# Patient Record
Sex: FEMALE | Race: WHITE | Employment: OTHER | URBAN - METROPOLITAN AREA
[De-identification: names, ages, dates, MRNs, and addresses within clinical notes are randomized per-mention and may not be internally consistent; named-entity substitution may affect disease eponyms.]

---

## 2017-01-01 ENCOUNTER — HOSPITAL ENCOUNTER (INPATIENT)
Facility: HOSPITAL | Age: 82
LOS: 3 days | Discharge: HOSPICE | DRG: 757 | End: 2017-01-01
Attending: EMERGENCY MEDICINE | Admitting: HOSPITALIST
Payer: MEDICARE

## 2017-01-01 ENCOUNTER — APPOINTMENT (OUTPATIENT)
Dept: GENERAL RADIOLOGY | Facility: HOSPITAL | Age: 82
DRG: 757 | End: 2017-01-01
Attending: EMERGENCY MEDICINE
Payer: MEDICARE

## 2017-01-01 ENCOUNTER — APPOINTMENT (OUTPATIENT)
Dept: CT IMAGING | Facility: HOSPITAL | Age: 82
DRG: 757 | End: 2017-01-01
Attending: HOSPITALIST
Payer: MEDICARE

## 2017-01-01 VITALS
DIASTOLIC BLOOD PRESSURE: 92 MMHG | BODY MASS INDEX: 16.38 KG/M2 | HEIGHT: 62 IN | TEMPERATURE: 97 F | OXYGEN SATURATION: 97 % | HEART RATE: 85 BPM | RESPIRATION RATE: 17 BRPM | SYSTOLIC BLOOD PRESSURE: 123 MMHG | WEIGHT: 89 LBS

## 2017-01-01 DIAGNOSIS — N17.9 AKI (ACUTE KIDNEY INJURY) (HCC): ICD-10-CM

## 2017-01-01 DIAGNOSIS — R53.1 WEAKNESS GENERALIZED: ICD-10-CM

## 2017-01-01 DIAGNOSIS — N30.00 ACUTE CYSTITIS WITHOUT HEMATURIA: Primary | ICD-10-CM

## 2017-01-01 PROCEDURE — 81001 URINALYSIS AUTO W/SCOPE: CPT | Performed by: FAMILY MEDICINE

## 2017-01-01 PROCEDURE — 70450 CT HEAD/BRAIN W/O DYE: CPT | Performed by: HOSPITALIST

## 2017-01-01 PROCEDURE — 99497 ADVNCD CARE PLAN 30 MIN: CPT | Performed by: NURSE PRACTITIONER

## 2017-01-01 PROCEDURE — 71010 XR CHEST AP PORTABLE  (CPT=71010): CPT | Performed by: EMERGENCY MEDICINE

## 2017-01-01 PROCEDURE — 87086 URINE CULTURE/COLONY COUNT: CPT | Performed by: FAMILY MEDICINE

## 2017-01-01 PROCEDURE — 99223 1ST HOSP IP/OBS HIGH 75: CPT | Performed by: NURSE PRACTITIONER

## 2017-01-01 RX ORDER — SODIUM CHLORIDE 9 MG/ML
INJECTION, SOLUTION INTRAVENOUS CONTINUOUS
Status: DISCONTINUED | OUTPATIENT
Start: 2017-01-01 | End: 2017-01-01

## 2017-01-01 RX ORDER — ONDANSETRON 2 MG/ML
4 INJECTION INTRAMUSCULAR; INTRAVENOUS EVERY 6 HOURS PRN
Status: DISCONTINUED | OUTPATIENT
Start: 2017-01-01 | End: 2017-01-01

## 2017-01-01 RX ORDER — IPRATROPIUM BROMIDE AND ALBUTEROL SULFATE 2.5; .5 MG/3ML; MG/3ML
3 SOLUTION RESPIRATORY (INHALATION) EVERY 6 HOURS PRN
Status: DISCONTINUED | OUTPATIENT
Start: 2017-01-01 | End: 2017-01-01

## 2017-01-01 RX ORDER — LISINOPRIL 30 MG/1
15 TABLET ORAL DAILY
COMMUNITY

## 2017-01-01 RX ORDER — BENZONATATE 200 MG/1
200 CAPSULE ORAL 3 TIMES DAILY PRN
Status: DISCONTINUED | OUTPATIENT
Start: 2017-01-01 | End: 2017-01-01

## 2017-01-01 RX ORDER — POLYETHYLENE GLYCOL 3350 17 G/17G
17 POWDER, FOR SOLUTION ORAL DAILY PRN
Status: DISCONTINUED | OUTPATIENT
Start: 2017-01-01 | End: 2017-01-01

## 2017-01-01 RX ORDER — FLUTICASONE PROPIONATE 50 MCG
1 SPRAY, SUSPENSION (ML) NASAL DAILY
Status: DISCONTINUED | OUTPATIENT
Start: 2017-01-01 | End: 2017-01-01

## 2017-01-01 RX ORDER — MELATONIN
1000 DAILY
Status: DISCONTINUED | OUTPATIENT
Start: 2017-01-01 | End: 2017-01-01

## 2017-01-01 RX ORDER — CLOPIDOGREL BISULFATE 75 MG/1
75 TABLET ORAL DAILY
Status: DISCONTINUED | OUTPATIENT
Start: 2017-01-01 | End: 2017-01-01

## 2017-01-01 RX ORDER — BISACODYL 10 MG
10 SUPPOSITORY, RECTAL RECTAL
Status: DISCONTINUED | OUTPATIENT
Start: 2017-01-01 | End: 2017-01-01

## 2017-01-01 RX ORDER — LORAZEPAM 2 MG/ML
0.5 INJECTION INTRAMUSCULAR EVERY 4 HOURS PRN
Status: DISCONTINUED | OUTPATIENT
Start: 2017-01-01 | End: 2017-01-01

## 2017-01-01 RX ORDER — GUAIFENESIN 600 MG
600 TABLET, EXTENDED RELEASE 12 HR ORAL 2 TIMES DAILY PRN
Status: DISCONTINUED | OUTPATIENT
Start: 2017-01-01 | End: 2017-01-01

## 2017-01-01 RX ORDER — HEPARIN SODIUM 5000 [USP'U]/ML
5000 INJECTION, SOLUTION INTRAVENOUS; SUBCUTANEOUS EVERY 8 HOURS SCHEDULED
Status: DISCONTINUED | OUTPATIENT
Start: 2017-01-01 | End: 2017-01-01

## 2017-01-01 RX ORDER — FLUCONAZOLE 2 MG/ML
200 INJECTION, SOLUTION INTRAVENOUS EVERY 24 HOURS
Status: DISCONTINUED | OUTPATIENT
Start: 2017-01-01 | End: 2017-01-01

## 2017-01-01 RX ORDER — SODIUM CHLORIDE 9 MG/ML
INJECTION, SOLUTION INTRAVENOUS ONCE
Status: COMPLETED | OUTPATIENT
Start: 2017-01-01 | End: 2017-01-01

## 2017-01-01 RX ORDER — ACETAMINOPHEN 325 MG/1
650 TABLET ORAL EVERY 6 HOURS PRN
Qty: 30 TABLET | Refills: 0 | Status: SHIPPED | OUTPATIENT
Start: 2017-01-01

## 2017-01-01 RX ORDER — ACETAMINOPHEN 325 MG/1
650 TABLET ORAL EVERY 6 HOURS PRN
Status: DISCONTINUED | OUTPATIENT
Start: 2017-01-01 | End: 2017-01-01

## 2017-01-01 RX ORDER — SCOLOPAMINE TRANSDERMAL SYSTEM 1 MG/1
1 PATCH, EXTENDED RELEASE TRANSDERMAL
Status: DISCONTINUED | OUTPATIENT
Start: 2017-01-01 | End: 2017-01-01

## 2017-01-01 RX ORDER — SCOLOPAMINE TRANSDERMAL SYSTEM 1 MG/1
1 PATCH, EXTENDED RELEASE TRANSDERMAL
Qty: 30 PATCH | Refills: 0 | Status: SHIPPED | OUTPATIENT
Start: 2017-01-01

## 2017-01-01 RX ORDER — GARLIC EXTRACT 500 MG
1 CAPSULE ORAL DAILY
Status: DISCONTINUED | OUTPATIENT
Start: 2017-01-01 | End: 2017-01-01

## 2017-01-01 RX ORDER — ESCITALOPRAM OXALATE 5 MG/1
5 TABLET ORAL DAILY
Status: DISCONTINUED | OUTPATIENT
Start: 2017-01-01 | End: 2017-01-01

## 2017-01-01 RX ORDER — BENZONATATE 200 MG/1
200 CAPSULE ORAL 3 TIMES DAILY PRN
Qty: 30 CAPSULE | Refills: 0 | Status: SHIPPED | OUTPATIENT
Start: 2017-01-01

## 2017-01-01 RX ORDER — MORPHINE SULFATE 2 MG/ML
1 INJECTION, SOLUTION INTRAMUSCULAR; INTRAVENOUS
Status: DISCONTINUED | OUTPATIENT
Start: 2017-01-01 | End: 2017-01-01

## 2017-01-05 PROBLEM — R63.5 WEIGHT GAIN: Status: ACTIVE | Noted: 2017-01-01

## 2017-03-09 PROBLEM — S61.412D: Status: ACTIVE | Noted: 2017-01-01

## 2017-04-01 PROBLEM — R53.1 WEAKNESS: Status: ACTIVE | Noted: 2017-01-01

## 2017-04-01 PROBLEM — E86.0 DEHYDRATION: Status: ACTIVE | Noted: 2017-01-01

## 2017-04-01 PROBLEM — R29.898 LEFT LEG WEAKNESS: Status: ACTIVE | Noted: 2017-01-01

## 2017-04-01 PROBLEM — M25.552 PAIN OF LEFT HIP JOINT: Status: ACTIVE | Noted: 2017-01-01

## 2017-04-18 PROBLEM — T79.6XXD TRAUMATIC RHABDOMYOLYSIS, SUBSEQUENT ENCOUNTER: Status: ACTIVE | Noted: 2017-01-01

## 2017-04-18 PROBLEM — N17.0 ACUTE RENAL FAILURE WITH TUBULAR NECROSIS (HCC): Status: ACTIVE | Noted: 2017-01-01

## 2017-04-18 PROBLEM — N30.00 ACUTE CYSTITIS WITHOUT HEMATURIA: Status: ACTIVE | Noted: 2017-01-01

## 2017-06-04 PROBLEM — R53.1 WEAKNESS GENERALIZED: Status: ACTIVE | Noted: 2017-01-01

## 2017-06-04 PROBLEM — N17.9 AKI (ACUTE KIDNEY INJURY) (HCC): Status: ACTIVE | Noted: 2017-01-01

## 2017-06-04 NOTE — H&P
JACK[facility] Hospitalist H&P       CC: generalized weakness    PCP: Delicia Alvarez MD    History of Present Illness:   Pt is an 79 yo with UPJ obstruction who is admitted for generalized weakness.  Pt lives in assisted living, and noted pt has not been as ac OF THE FOLLOWING EVENTS Right 6/6/2016    Comment Procedure: CYSTOSCOPY, STENT EXCHANGE;  Surgeon: Jerry Antoine MD;  Location: [address] [place] [address]    OTHER SURGICAL HISTORY      Comment Insertion of right double J stent     OTHER SURGICAL HISTOR 108   CO2  31.0   BUN  47*   CREATSERUM  1.56*   GLU  91   CA  8.8       Recent Labs   Lab  06/04/17   1407   ALT  56*   AST  45*   ALB  2.8*       No results for input(s): TROP in the last 72 hours.     Additional Diagnostics: ECG: afib HR 80    CXR: image outlined    Thank Karen Arauz MD  [facility] Hospitalist  Pager [phone]  Answering Service number: [phone]

## 2017-06-04 NOTE — CM/SW NOTE
Daughter provided with a list of [place] facilities. Daughter currently thinking she will look for a respite care bed until she can be placed in an assisted living facility.   At that time, daughter is [facility] as [facility] is not appropria

## 2017-06-04 NOTE — ED PROVIDER NOTES
Patient Seen in: [facility] Emergency Department    History   Patient presents with:  Poor Feed Anorexia (constitutional)    Stated Complaint: not eatting    HPI    27-year-old female, history as noted below, brought in by daughter with concern for [address]    CYSTOSCOPY,INSERT URETERAL STENT Right 6/6/2016    Comment Procedure: CYSTOSCOPY, STENT EXCHANGE;  Surgeon: Delio Lugo MD;  Location: [address] [place] [address]    PATIENT WITH PREOPERATIVE ORDER FOR IV ANTIBIOTIC SURGICAL SIT and negative except as noted above. PSFH elements reviewed from today and agreed except as otherwise stated in HPI.     Physical Exam       ED Triage Vitals   BP 06/04/17 1507 116/73 mmHg   Pulse 06/04/17 1333 77   Resp 06/04/17 1333 18   Temp 06/04/17 1 limits   GGT (GAMMA GLUTAMYL TRANSPEPTIDASE) - Abnormal; Notable for the following:     Gamma Glutamyl Transferase 359 (*)     All other components within normal limits   CBC W/ DIFFERENTIAL - Abnormal; Notable for the following:     .5 (*)     RDW- generalized    Disposition:  Admit    Follow-up:  No follow-up provider specified.     Medications Prescribed:  Current Discharge Medication List        Present on Admission  Date Reviewed: 1/5/2017          ICD-10-CM Noted POA    * (Principal)Acute cystiti

## 2017-06-04 NOTE — PROGRESS NOTES
Brief Internal Medicine Note    Full Note to Follow      Pt is an 79 yo with UPJ obstruction who is admitted for generalized weakness.   Pt lives in assisted living, and noted pt has not been as active as usual. Is usually with more energy but for the pas

## 2017-06-05 NOTE — PLAN OF CARE
DISCHARGE PLANNING    • Discharge to home or other facility with appropriate resources Progressing        GASTROINTESTINAL - ADULT    • Minimal or absence of nausea and vomiting Progressing    • Maintains or returns to baseline bowel function Progressing ABLE TO TALK MORE TODAY,

## 2017-06-05 NOTE — RESPIRATORY THERAPY NOTE
RT called to bedside for patient assessment and requested PRN neb per RN. Patient was breathing 16/minute on 2 LPM nasal cannula saturating 99%. Breath sounds were slightly diminished but mostly clear. No wheezing, no accessory muscle usage at this time.  Josette Matos

## 2017-06-05 NOTE — OCCUPATIONAL THERAPY NOTE
OCCUPATIONAL THERAPY EVALUATION - INPATIENT     Room Number: 303/303-A  Evaluation Date: 6/5/2017  Type of Evaluation: Initial  Presenting Problem: Generalized weakness, GARY, UTI, recent L hip pinning    Physician Order: IP Consult to Occupational Therapy IV ANTIBIOTIC SURGICAL SITE INFECT Right 6/6/2016    Comment Procedure: CYSTOSCOPY, STENT EXCHANGE;  Surgeon: Pamela Ku MD;  Location: [address] [place] [address]    PATIENT DOCUMENTED NOT TO HAVE EXPERIENCED ANY OF THE FOLLOWING EVENTS Right 6/6/201 Span:  appears intact  Orientation Level:  Oriented to self and situation, knew in hospital but not which one, knew 2017 but thought month still May  Following Commands:  follows one step commands consistently and follows multistep commands consistently supine > reinforcement on PWB status for LLE with good verbal understanding > sitting EOB to L side via min assist with increased time and cueing > standing via RW, min assist, and cueing for PWB, and cueing for safe hand placement > several steps to bedsi level of function. Patient scored an 8/28 during cognitive screening using the Short Blessed Test. Scores of 5-9 indicate questionable impairment, and scores of 10 or more indicate impairment consistent with dementia.  Results of the AM-PAC \"6 clicks\" Inp

## 2017-06-05 NOTE — PROGRESS NOTES
RECEIVED FROM ER PER CART, ACCOMPANIED BY DAUGHTER WHO HELPS WITH ADMISSION DATA BASE, PT ALERT AND ORIENTED BUT C/O FEELING TIRED AND DOZING AT INTERVALS, O2 ON AT 2 LITERS AND SATS >90, PT HAS OCCASIONAL NON PRODUCTIVE LOOSE COUGH, DENIES ANY SHORTNESS O

## 2017-06-05 NOTE — PHYSICAL THERAPY NOTE
PHYSICAL THERAPY EVALUATION - INPATIENT     Room Number: 303/303-A  Evaluation Date: 6/5/2017  Type of Evaluation: Initial  Physician Order: PT Eval and Treat    Presenting Problem: Generalized weakness, GARY and acute cystitis c hematuria, UTI  Reason PREOPERATIVE ORDER FOR IV ANTIBIOTIC SURGICAL SITE INFECT Right 6/6/2016    Comment Procedure: CYSTOSCOPY, STENT EXCHANGE;  Surgeon: Amy Gandara MD;  Location: [address] [place] [address]    PATIENT DOCUMENTED NOT TO HAVE EXPERIENCED ANY OF THE Amaury Spoon oriented to person and oriented to year/  · Following Commands:  follows multistep commands with increased time and follows multistep commands with repetition  · Safety Judgement:  good awareness of safety precautions    RANGE OF MOTION AND STRENGTH ASS walker  Pattern: L Decreased stance time; Shuffle;L Flexed knee;L Foot drag          Skilled Therapy Provided: Pt presents to PT lying supine in bed. Pt is agreeable to participate, however reporting that she is very tired and fatigued.  Supine/sit c min A f Elderly Mobility scale and is at high risk for future falls. Subacute rehab is recommended for 12-15 days. After this period of rehabilitation patient should achieve mod indep level in bed mobility, t/f and amb using RW.  Pt and their family is considering

## 2017-06-05 NOTE — PROGRESS NOTES
THIS EVENING C/O SOME SHORTNESS OF BREATH AND SLIGHT WHEEZING NOTED, DUONEB TX GIVEN AND PT STATES SHE IS BREATHING BETTER, O2 SATS MAINTAIN >90 ON 1 LITER OF O2 PER NASAL CANNULA

## 2017-06-05 NOTE — PROGRESS NOTES
[facility] Hospitalist Progress Note     PCP: Ke Chacon MD    CC:  Follow up    SUBJECTIVE:  Pt sitting up in bed, feels better today. Tessalon helping with cough.     OBJECTIVE:  Temp:  [97.6 °F (36.4 °C)-98.2 °F (36.8 °C)] 97.8 °F (36.6 °C)  Pulse:  [66 06/05/17 1237     acetaminophen, ondansetron HCl, PEG 3350, bisacodyl, ipratropium-albuterol, GuaiFENesin ER, benzonatate       Assessment/Plan:       79 yo with UPJ obstruction who is admitted for generalized weakness.     **generalized weakness- suspect

## 2017-06-05 NOTE — CM/SW NOTE
06/05/17 1100   CM/SW Referral Data   Referral Source Physician;Social Work (self-referral)   Reason for Referral Discharge planning   Informant Patient; Children   Pertinent Medical Hx   Primary Care Physician Name Dariel Hollis   Patient Info   Patient's Mental

## 2017-06-05 NOTE — DIETARY MALNUTRITION NOTE
[facility]    NUTRITION INITIAL ASSESSMENT    Pt meets malnutrition criteria.     NUTRITION DIAGNOSIS/PROBLEM:    Malnutrition related to physiological causes increasing nutrient needs as evidenced by weight loss of 8# (8%) x 1 month, underweight with EVALUATE/NUTRITION GOALS:    1. PO intake to meet at least 75% patient nutrition prescription  2. At least 75% intake of oral supplements  3. No signs of skin breakdown  4.  Maintain lean body mass    MEDICATIONS:  Noted    LABS:  Noted  Pt is at moderate n

## 2017-06-06 NOTE — SLP NOTE
ADULT SWALLOWING EVALUATION    ASSESSMENT & PLAN   ASSESSMENT  Orders were received for a bedside swallowing evaluation.  RN reported to this writer that patient demonstrated coughing and throat clearing with a wet vocal quality during breakfast on this sammie liquids  Dysphagia History: Denies past history, none reported  Imaging Results:   CXR:   Small bilateral pleural effusions and a right lower lobe consolidation is noted.          OBJECTIVE   ORAL MOTOR EXAMINATION  Dentition: Functional  Symmetry: Within F

## 2017-06-06 NOTE — PROGRESS NOTES
RN notified by PCT Paige Patrick pt wants her daughter to come to hospital at this time. RN in room assessing pt. Pt requesting to write on paper and not speak. Pt wrote \"Call daughter Stanford Ulrich to come. \" RN notified pt's daughter; Jesus River on her way.

## 2017-06-06 NOTE — CONSULTS
[address]  [id number]  Hospital Day #2  Date of Consult: 06/06/17       Reason for Consultation: Consult requested for evaluation of palliative care needs and goals of care discussion.     His Comment Procedure: CYSTOSCOPY, STENT EXCHANGE;  Surgeon: Dedra Lewis MD;  Location: [address] [place] [address]    OTHER SURGICAL HISTORY      Comment Insertion of right double J stent     OTHER SURGICAL HISTORY  12/10/15    Comment Cysto, Stent Exhang Agent's Phone Number: [phone]  Describe Patient Wishes: DNR    Symptoms(s): dyspnea, cough  --comfort orders written    Psychosocial: Daughter tearful when discussing her mother's failing health. Offered bedside counseling with [facility] and she accepted.

## 2017-06-06 NOTE — PALLIATIVE CARE NOTE
Referral sent to [facility] to eval pt for [facility] In[facility]. PCSW advised [facility] to contact pts daughter/Concepcion ASAP to schedule a time to meet for eval; [facility] accepted and [facility] agreeable. PCSW contacted pts daughter/Concepcion and advised of above; agreeable.

## 2017-06-06 NOTE — CERTIFICATION
**Certification    PHYSICIAN Certification of Need for Inpatient Hospitalization    Based on the her current state of illness, Roberta Driscoll requires inpatient hospitalization for her weakness.   This requires inpatient medical treatment because the patient's

## 2017-06-06 NOTE — OCCUPATIONAL THERAPY NOTE
Attempted to see pt for skilled OT services this date. Pt is currently off the floor for imaging. Will re-attempt as schedule allows.  Cristian Regalado, 06/06/2017, 1:45 PM

## 2017-06-07 NOTE — OCCUPATIONAL THERAPY NOTE
Pt and family have decided to pursue hospice/comfort care. Will D/C OT as therapy is not consistent with end of life care.      Thank you for allowing me to care for this patient,   Carmina Orellana, OTR/L   Occupational Therapist   [facility]

## 2017-06-07 NOTE — PALLIATIVE CARE NOTE
[address] Work Follow Up  Discussion:  PCSW confirmed with [facility]/Emily that Vonda Moreno is going to see the pt at 10am to initiate transfer to [place] [address] Unit.  PCSW pts RN/Bernie, Unit CM/Chely & pts nathan

## 2017-06-07 NOTE — SLP NOTE
Pt and family have chosen to pursue hospice. ST will sign off at this time. Please follow aspiration precautions with all po. Orly Barcenas M.S.,CCC-SLP  Speech Language Pathologist

## 2017-06-07 NOTE — PLAN OF CARE
Patient alert and oriented x2-3; forgetful regarding to time and place. Patient stating she is \"weak. \" Patient lethargic this morning; head CT and SLP    Tolerating very small amounts of food and ice chips. Pleasure feeds.  Patient able to feed self puddi

## 2017-06-07 NOTE — PLAN OF CARE
Patient sleeping. Opens eyes when daughter speaking. poc discussed with daughter. Comfort measures in place. Plan to [facility].

## 2017-06-07 NOTE — PLAN OF CARE
Received call from Rodri at [facility], states pt has been accepted & would like to transfer pt tonight if she agrees. Spoke with pt & daughter who both agree to transfer.  Call placed to Dr Monse Melendez for discharge order, informed of Dr Nerissa Holloway last note

## 2017-06-07 NOTE — PLAN OF CARE
IV to be left in per [address] Reanna Jacobson RN and Luis RN. Report given to [facility]. Daughter at bedside. Ambulance here to take patient to [facility].

## 2017-06-08 NOTE — DISCHARGE SUMMARY
General Medicine Discharge Summary     Patient ID:  Selma Garcia  80year old  2/6/1929    Admit date: 6/4/2017    Discharge date and time: 6/7/2017 12:11 PM     Attending Physician: Mai TUCKER for UTI  -was treated with IV ceftriaxone and doxy for now, probiotic too. Interestingly- no leukocytosis.  afebrile  -preliminary urine culture with candida- was treated with fluconazole  -On 6/6, concerned for increased lethargy- CT Head negative for acut BISULFATE 75 MG Oral Tab          Activity: activity as tolerated  Diet: as tolerated  Wound Care: as directed  Code Status: Prior      Exam on day of discharge:      06/06/17  2132   BP: 123/92   Pulse: 85   Temp: 97.4 °F (36.3 °C)   Resp: 17       Genera

## 2022-10-18 NOTE — PROGRESS NOTES
[facility] Hospitalist Progress Note     PCP: Ava Vines MD    CC:  Follow up PNA/ UTI    SUBJECTIVE:  Pt lying in bed. Per daughter more weak than last night.  Patient arouses to voice and whispers name, birth month and year when asked orientation question Acidophilus/Pectin  1 capsule Oral Daily   • lisinopril  15 mg Oral Daily   • escitalopram  5 mg Oral Daily   • Clopidogrel Bisulfate  75 mg Oral Daily   • cyanocobalamin  1,000 mcg Oral Daily   • Fluticasone Propionate  1 spray Each Nare Daily     • sodiu Jesse Bustos MD    [facility] on and off smoker for 40 years (1 pack a day, hasn't smoked for a couple of months);

## (undated) NOTE — IP AVS SNAPSHOT
[facility] [place] [address] [place], [address] ~ [phone]                Discharge Summary   6/4/2017    Berny Mosqueda           Admission Information        Provider Department    6/4/2017 Kelly Lees MD  3nw-A         Thank HYDROcodone-acetaminophen 5-325 MG Tabs   Commonly known as:  NORCO        TAKE ONE TABLET BY MOUTH EVERY 6 HOURS AS NEEDED FOR PAIN    Marleny CAMPBELL                           lisinopril 30 MG Tabs   Commonly known as:  LALA,ZESTRIL        Ta Abs Final Neut Abs Lymphocyte Abso Monocyte Absolu Eosinophil Abso Basophil Absolu    (06/05/17)  66.3 (06/05/17)  16.1 (06/05/17)  13.8 (06/05/17)  3.3 (06/05/17)  0.1 -- (06/05/17)  4.87 (06/05/17)  1.18 (06/05/17)  1.01 (H) (06/05/17)  0.24 (06/05/17) Medicaid plans. To get signed up and covered, please call [phone] and ask to get set up for an insurance coverage that is in-network with [facility].         MyChart     Visit [employer]  You can access your MyChart to more actively manage Most common side effects: Constipation, drowsiness, dizziness, urinary retention (inability to urinate)   What to report to your healthcare team: Difficulty urinating, dizziness, no bowel movement in 2+ days, unresolved pain           Non-Narcotic Pain Med

## (undated) NOTE — IP AVS SNAPSHOT
Patient Demographics     Address Phone E-mail Address    [address] [phone] [facility]) [email or web address]      Emergency Contact(s)     Name Relation Home Work Mobile    Don Gant Daughter [phone] Daughter Please  your prescriptions at the location directed by your doctor or nurse     Bring a paper prescription for each of these medications    - acetaminophen 325 MG Tabs  - benzonatate 200 MG Caps  - Scopolamine 1 MG/3DAYS Pt72            303-303-A - ABG Device Nasal cannula   [facility]            Testing Performed By     Lab - Abbreviation Name Director Address Valid Date Range    [place] [address] LAB Zabrina Ding  [address] [id number] 02/03/16 1201 - Present Pt is an 79 yo with UPJ obstruction who is admitted for generalized weakness.  Pt lives in assisted living, and noted pt has not been as active as usual. Is usually with more energy but for the past few days, has remained in bed.  Also notes coughing with MD MERLENE;  Location: [place] [address]      Comment Insertion of right double J stent     OTHER SURGICAL HISTORY  12/10/15    Comment Cysto, Stent Exhange    OTHER SURGICAL HISTORY  12/8/16    Comment Cysto, Rt.  Stent Exchange ALT  56*   AST  45*   ALB  2.8*       No results for input(s): TROP in the last 72 hours.     Additional Diagnostics: ECG: afib HR 80    CXR: image personally reviewed agree with radiologist read    Radiology:   Xr Chest Ap Portable  (cpt=71010)    6/4/2017 Answering Service number: [phone]                   Electronically signed by Rosemarie Corrigan MD on 6/4/2017  6:46 PM            D/C Summary     No notes of this type exist for this encounter.          Physical Therapy Notes (last 72 hours) (Not Past Medical History  Past Medical History   Diagnosis Date   • Ac's neuroma      history   • Basal cell adenocarcinoma    • HEART MURMUR    • UPJ (ureteropelvic junction) obstruction    • Hydronephrosis    • High blood pressure    • Hearing impair Drives: No  Patient Owned Equipment: Cane;Rolling walker; Other (Comment) (grab bars, life alert button)  Patient Regularly Uses: Hearing aides    Prior Level of Pope: Prior to her L hip sx, pt was indep c all ADLs and IADLs; pt does not drive; pt r NEUROLOGICAL FINDINGS  Neurological Findings: Sensation           Sensation: BLE intact       ACTIVITY TOLERANCE  O2 Saturation: 99%  Liters of O2:  2  No shortness of breath    AM-PAC '6-Clicks' INPATIENT SHORT FORM - BASIC MOBILITY control c desc to [facility]. BLE were elev and RN made aware. PT discussed the importance of regular mobility including t/f to the [facility] TID-in part d/t respiratory impairments.      Exercise/Education Provided:  Bed mobility  Body mechanics  Gait training  Posture Number of Visits to Meet Established Goals: 5      CURRENT GOALS    Goal #1 Patient is able to demonstrate supine - sit EOB @ level: supervision     Goal #2 Patient is able to demonstrate transfers Sit to/from Stand at assistance level: supervision     [facility] • Muscle weakness    • Arrhythmia        Past Surgical History      Past Surgical History    HYSTERECTOMY      CYSTOSCOPY,INSERT URETERAL STENT Right 12/10/2015    Comment Procedure: CYSTOSCOPY, STENT EXCHANGE;  Surgeon: David Hewitt MD;  Location:  ADLs and IADLs; pt does not drive; pt reports that she would intermittently amb using RW and SC, however since in [facility] pt was amb using RW c staff and walking several feet.  Pt feels she reached a plateau in rehab due to them always 'doing the same things wi AM-PAC '6-Clicks' INPATIENT SHORT FORM - BASIC MOBILITY  How much difficulty does the patient currently have. ..  -   Turning over in bed (including adjusting bedclothes, sheets and blankets)?: A Lot   -   Sitting down on and standing up from a chair with a Posture  Transfer training    Patient End of Session: Up in chair;Needs met;Call light within reach;RN aware of session/findings; All patient questions and concerns addressed; Discussed recommendations with /    TYSHAWN Adair Goal Comments: Goals established on 6/5/2017                Occupational Therapy Notes (last 72 hours) (Notes from 6/4/2017  7:46 AM through 6/7/2017  7:46 AM)      Occupational Therapy Note by Aruna Gauthier at 6/6/2017  1:45 PM  Version 1 of 1    Author history   • Basal cell adenocarcinoma    • HEART MURMUR    • UPJ (ureteropelvic junction) obstruction    • Hydronephrosis    • High blood pressure    • Hearing impairment    • Muscle weakness    • Arrhythmia        Past Surgical History      Past Surgica Occupation/Status: Retired     Drives: No  Patient Regularly Uses: Hearing aides    Prior Level of Gooding: Patient reports until initial hip fracture last month, she was still living alone and independent with all BADLs and most IADLs.  She reports si [employer]    WFL      ADDITIONAL TESTS                    Short Blessed Test:  (8/28)               NEUROLOGICAL FINDINGS  Neurological Findings: None                ACTIVITY TOLERANCE  O2 Saturation: 98%  O2 Device: Nasal cannula  Liters of O2:  2  No sh of 2pm), and missed one month when saying months backwards (April). Patient also educated on safety, fall prevention, OT role, care plan and discharge recommendations.    Patient End of Session: Up in chair;Needs met;Call light within reach;RN aware of sess training; Endurance training;Patient/Family education;Patient/Family training; Compensatory technique education  Rehab Potential : Fair  Frequency (Obs): 5x/week  Number of Visits to Meet Established Goals: 5    ADL GOALS   Patient will perform Lower Body Dr • UPJ (ureteropelvic junction) obstruction    • Hydronephrosis    • High blood pressure    • Hearing impairment    • Muscle weakness    • Arrhythmia        Past Surgical History      Past Surgical History    HYSTERECTOMY      CYSTOSCOPY,INSERT URETERAL TOBI Patient Regularly Uses: Hearing aides    Prior Level of [place]: Patient reports until initial hip fracture last month, she was still living alone and independent with all BADLs and most IADLs.  She reports since being in rehab, she has been improving, Short Blessed Test:  (8/28)               NEUROLOGICAL FINDINGS  Neurological Findings: None                ACTIVITY TOLERANCE  O2 Saturation: 98%  O2 Device: Nasal cannula  Liters of O2:  2  No shortness of breath    ACTIVITIES OF DAILY LIVING addressed; Discussed recommendations with /    ASSESSMENT   Patient is a 80year old female admitted 6/4/2017 from [facility] for generalized weakness, GARY, UTI, also with recent L hip pinning last month with PWB 50% status per chart revie Patient will perform Toileting with supervision    FUNCTIONAL TRANSFER GOALS   Patient will transfer Supine to Sit with supervision  Patient will transfer Sit to Supine with supervision  Patient will transfer to Toilet with supervision    ADDITIONAL GOALS MEDICAL HISTORY  Reason for Referral: RN dysphagia screen;R/O aspiration    Problem List  Principal Problem:    Acute cystitis without hematuria  Active Problems:    GARY (acute kidney injury) ([facility] [address]    Weakness generalized      Past Medical History  Past Me (Please note: Silent aspiration cannot be evaluated clinically.  Videofluoroscopic Swallow Study is required to rule-out silent aspiration.)    Esophageal Phase of Swallow: No complaints consistent with possible esophageal involvement      GOALS  Goal #1 Pt